# Patient Record
Sex: FEMALE
[De-identification: names, ages, dates, MRNs, and addresses within clinical notes are randomized per-mention and may not be internally consistent; named-entity substitution may affect disease eponyms.]

---

## 2021-08-28 ENCOUNTER — HOSPITAL ENCOUNTER (EMERGENCY)
Dept: HOSPITAL 94 - ER | Age: 71
Discharge: LEFT BEFORE DISCHARGE | End: 2021-08-28
Payer: MEDICARE

## 2021-08-28 VITALS — DIASTOLIC BLOOD PRESSURE: 88 MMHG | SYSTOLIC BLOOD PRESSURE: 152 MMHG

## 2021-08-28 VITALS — HEIGHT: 67 IN | WEIGHT: 280.58 LBS | BODY MASS INDEX: 44.04 KG/M2

## 2021-08-28 DIAGNOSIS — Z53.21: ICD-10-CM

## 2021-08-28 DIAGNOSIS — Z00.8: Primary | ICD-10-CM

## 2021-08-28 NOTE — NUR
PT AND HER  CHECKED IN FOR MH SERVICED.  PT WAS TOLD THAT THERE WOULD BE 
A BED WAITING FOR THEM.  PT WAS ADVISED THAT THE ER WAS FULL AND THAT THERE WAS 
GOING TO BE A WAIT OF AN UNKNOWN AMOUNT OF TIME.  IT WAS EXPLAINED TO THE PT 
THE PROCESS FOR MH EVALUATIONS AND THAT WHEN THERE WAS AN AVAILABLE ROOM FOR 
HER WE WOULD GET HER BACK ASAP, AND THAT WE WOULD START WITH GETTING LABS WHILE 
WAITING FOR AN OPEN ROOM.  PT STATES THAT SHE CAN NOT WAIT OR SIT IN HER 
SCOOTER FOR A LONG PERIOD OF TIME AND THAT SHE WANTED TO GO HOME.  PT'S  
WANTED TO GO HOME AND CALL TO SEE WHEN A ROOM WOULD BE AVAILABLE.  IT WAS 
EXPLAINED THAT THIS WAS NOT THE PROCESS, THAT WE DO NOT HOLD ROOMS AND THAT IF 
THEY DECIDED TO GO HOME AND WANTED TO COME BACK THAT THE PROCESS OF WAITING WAS 
POSSIBLE AND THAT LABS WOULD HAVE TO BE DRAWN AGAIN.

PT AND  DECIDED THAT THEY WOULD GO HOME AND GIVE THE PT'S MEDICATIONS A 
CHANCE TO WORK.  PT WAS ADVISE TO F/U WITH HER PSYCHIATRIST AND PMD MONDAY BUT 
THEY SAID THEIR DRS WERE NOT HELP.  PT WAS ENCOURAGED TO STAY AND THAT WE WOULD 
TRY TO GET HER IN ASAP, BUT PT WANTED TO GO HOME.



CHG RN WAS INFORMED OF PT'S HX AND DECISION. Detail Level: Detailed Detail Level: Simple

## 2021-10-01 ENCOUNTER — HOSPITAL ENCOUNTER (EMERGENCY)
Dept: HOSPITAL 94 - ER | Age: 71
LOS: 4 days | Discharge: HOME | End: 2021-10-05
Payer: MEDICARE

## 2021-10-01 VITALS — HEIGHT: 67 IN | WEIGHT: 279.99 LBS | BODY MASS INDEX: 43.94 KG/M2

## 2021-10-01 DIAGNOSIS — R53.1: ICD-10-CM

## 2021-10-01 DIAGNOSIS — F41.9: ICD-10-CM

## 2021-10-01 DIAGNOSIS — R45.851: Primary | ICD-10-CM

## 2021-10-01 DIAGNOSIS — Z20.822: ICD-10-CM

## 2021-10-01 DIAGNOSIS — F32.9: ICD-10-CM

## 2021-10-01 LAB
ALBUMIN SERPL BCP-MCNC: 3.6 G/DL (ref 3.4–5)
ALBUMIN/GLOB SERPL: 1 {RATIO} (ref 1.1–1.5)
ALP SERPL-CCNC: 124 IU/L (ref 46–116)
ALT SERPL W P-5'-P-CCNC: 45 U/L (ref 12–78)
AMPHETAMINES UR QL SCN: NEGATIVE
ANION GAP SERPL CALCULATED.3IONS-SCNC: 9 MMOL/L (ref 8–16)
AST SERPL W P-5'-P-CCNC: 26 U/L (ref 10–37)
BACTERIA URNS QL MICRO: (no result) /HPF
BARBITURATES UR QL SCN: NEGATIVE
BASOPHILS # BLD AUTO: 0 X10'3 (ref 0–0.2)
BASOPHILS NFR BLD AUTO: 0.4 % (ref 0–1)
BENZODIAZ UR QL SCN: POSITIVE
BILIRUB SERPL-MCNC: 0.5 MG/DL (ref 0.1–1)
BUN SERPL-MCNC: 15 MG/DL (ref 7–18)
BUN/CREAT SERPL: 18.3 (ref 6.6–38)
BZE UR QL SCN: NEGATIVE
CALCIUM SERPL-MCNC: 9.1 MG/DL (ref 8.5–10.1)
CANNABINOIDS UR QL SCN: NEGATIVE
CHLORIDE SERPL-SCNC: 102 MMOL/L (ref 99–107)
CLARITY UR: (no result)
CO2 SERPL-SCNC: 28.7 MMOL/L (ref 24–32)
COLOR UR: YELLOW
CREAT SERPL-MCNC: 0.82 MG/DL (ref 0.4–0.9)
DEPRECATED SQUAMOUS URNS QL MICRO: (no result) /LPF
EOSINOPHIL # BLD AUTO: 0.2 X10'3 (ref 0–0.9)
EOSINOPHIL NFR BLD AUTO: 2.6 % (ref 0–6)
ERYTHROCYTE [DISTWIDTH] IN BLOOD BY AUTOMATED COUNT: 16.3 % (ref 11.5–14.5)
ETHANOL SERPL-MCNC: < 0.01 GM/DL (ref 0–0.01)
GFR SERPL CREATININE-BSD FRML MDRD: 69 ML/MIN
GLUCOSE SERPL-MCNC: 153 MG/DL (ref 70–104)
GLUCOSE UR STRIP-MCNC: NEGATIVE MG/DL
HCT VFR BLD AUTO: 45.1 % (ref 35–45)
HGB BLD-MCNC: 15 G/DL (ref 12–16)
HGB UR QL STRIP: NEGATIVE
KETONES UR STRIP-MCNC: NEGATIVE MG/DL
LEUKOCYTE ESTERASE UR QL STRIP: NEGATIVE
LYMPHOCYTES # BLD AUTO: 1.3 X10'3 (ref 1.1–4.8)
LYMPHOCYTES NFR BLD AUTO: 16.3 % (ref 21–51)
MCH RBC QN AUTO: 30.2 PG (ref 27–31)
MCHC RBC AUTO-ENTMCNC: 33.2 G/DL (ref 33–36.5)
MCV RBC AUTO: 91 FL (ref 78–98)
METHADONE UR QL SCN: NEGATIVE
MONOCYTES # BLD AUTO: 0.5 X10'3 (ref 0–0.9)
MONOCYTES NFR BLD AUTO: 6.3 % (ref 2–12)
MUCOUS THREADS URNS QL MICRO: (no result) /LPF
NEUTROPHILS # BLD AUTO: 6.1 X10'3 (ref 1.8–7.7)
NEUTROPHILS NFR BLD AUTO: 74.4 % (ref 42–75)
NITRITE UR QL STRIP: POSITIVE
OPIATES UR QL SCN: NEGATIVE
PCP UR QL SCN: NEGATIVE
PH UR STRIP: 5 [PH] (ref 4.8–8)
PLATELET # BLD AUTO: 235 X10'3 (ref 140–440)
PMV BLD AUTO: 8 FL (ref 7.4–10.4)
POTASSIUM SERPL-SCNC: 4.1 MMOL/L (ref 3.5–5.1)
PROT SERPL-MCNC: 7.1 G/DL (ref 6.4–8.2)
PROT UR QL STRIP: NEGATIVE MG/DL
RBC # BLD AUTO: 4.95 X10'6 (ref 4.2–5.6)
RBC #/AREA URNS HPF: (no result) /HPF (ref 0–2)
SODIUM SERPL-SCNC: 140 MMOL/L (ref 135–145)
SP GR UR STRIP: 1.02 (ref 1–1.03)
URN COLLECT METHOD CLASS: (no result)
UROBILINOGEN UR STRIP-MCNC: 0.2 E.U/DL (ref 0.2–1)
WBC # BLD AUTO: 8.2 X10'3 (ref 4.5–11)
WBC #/AREA URNS HPF: (no result) /HPF (ref 0–4)

## 2021-10-01 PROCEDURE — 80320 DRUG SCREEN QUANTALCOHOLS: CPT

## 2021-10-01 PROCEDURE — 80305 DRUG TEST PRSMV DIR OPT OBS: CPT

## 2021-10-01 PROCEDURE — 36415 COLL VENOUS BLD VENIPUNCTURE: CPT

## 2021-10-01 PROCEDURE — 82948 REAGENT STRIP/BLOOD GLUCOSE: CPT

## 2021-10-01 PROCEDURE — 80053 COMPREHEN METABOLIC PANEL: CPT

## 2021-10-01 PROCEDURE — 81001 URINALYSIS AUTO W/SCOPE: CPT

## 2021-10-01 PROCEDURE — 99285 EMERGENCY DEPT VISIT HI MDM: CPT

## 2021-10-01 PROCEDURE — 87635 SARS-COV-2 COVID-19 AMP PRB: CPT

## 2021-10-01 PROCEDURE — 85025 COMPLETE CBC W/AUTO DIFF WBC: CPT

## 2021-10-01 PROCEDURE — 84443 ASSAY THYROID STIM HORMONE: CPT

## 2021-10-01 RX ADMIN — PROPRANOLOL HYDROCHLORIDE SCH MG: 10 TABLET ORAL at 20:55

## 2021-10-01 NOTE — NUR
The patient moved to bed 23 in the ER overflow. She was tearful, very anxious 
and overwhelmed. She stated that she has been thinking about suicide all the 
time and added, "I really wish I could die" She is incontinent of urine and a 
straight cath was done to obtain a urine samble. Her urine was very dark. She 
is currently a patient at Dr. Cason office in [place] and was last seen a 
couple of weeks ago. She did have a suicide attempt approximately one and half 
months ago by taking an overdose of rx medications. She is nonambulatory and 
weak. She has difficulty during side to side. She has open wounds on her 
buttocks. She has yeast rash to bilateral breasts. A PT consult was ordered. SW 
was paged to assess needs at home. Psychiatry was notified that the patient 
would need a medication consult.

## 2021-10-02 RX ADMIN — LEVOTHYROXINE SODIUM SCH MCG: 75 TABLET ORAL at 08:36

## 2021-10-02 RX ADMIN — Medication SCH CAN: at 13:25

## 2021-10-02 RX ADMIN — Medication SCH CAN: at 18:00

## 2021-10-02 RX ADMIN — PROPRANOLOL HYDROCHLORIDE SCH MG: 10 TABLET ORAL at 20:21

## 2021-10-02 RX ADMIN — PROPRANOLOL HYDROCHLORIDE SCH MG: 10 TABLET ORAL at 08:36

## 2021-10-02 RX ADMIN — PROPRANOLOL HYDROCHLORIDE SCH MG: 10 TABLET ORAL at 13:22

## 2021-10-02 NOTE — NUR
Pt. incontinent of urine. Pt.'s linens changed and chucks put down. Pt. turned 
to her left side. Pt. c/o of anxiety and asked for something to distract her. 
Pt. given TV to watch.

## 2021-10-02 NOTE — NUR
Pt. awake and asking to be turned to relieve her pressure wounds. Assisted 
patient to turn onto her left side.  States she is "so uncomfortable". Admits 
to SI, had a plan to OD on pills, states she attempted suicide via this method 
"around a month and a half ago", went to [facility]. States she is very 
depressed and cannot get out of bed or ambulate. Pt feels she will be unable to 
go back to sleep.

## 2021-10-02 NOTE — NUR
1:1 done at bedside. PtNiels izquierdoies current SI/HI, A/V hallucinations. Pt. A&Ox4 
and cooperative with assessment. 

-------------------------------------------------------------------------------

Addendum: 10/02/21 at 1525 by SCOTT

-------------------------------------------------------------------------------

Pt. turned

## 2021-10-02 NOTE — NUR
Pt awake and requesting to be turned. Assisted patient to turn onto her left 
side. Pt states "that's much better."

## 2021-10-02 NOTE — NUR
Consulted with ER MD, he ordered 0.5 mg Ativan PO for pt's sleeplessness. If 
this is not effective, will consider additional dose of seroquel. Pt was 
compliant with medication.

## 2021-10-02 NOTE — NUR
Pt. awake and in bed in supine position. Pt. refused breakfast. Pt. took all 
medications. Pt. incontinent of urine, Pt. cleaned and changed. Pt. turned onto 
right side. Nystatin powder applied under breasts and pannis.

## 2021-10-02 NOTE — NUR
The patient refused her dinner but did accept her ensure drink. She stated that 
tonight her mood is good and that she currently is not suicidal and stated that 
she needs to go to a facility that is not psychiatric but based on her medical 
needs. She is alert and oriented. She is pleasant when approached

## 2021-10-03 RX ADMIN — LEVOTHYROXINE SODIUM SCH MCG: 75 TABLET ORAL at 08:25

## 2021-10-03 RX ADMIN — PROPRANOLOL HYDROCHLORIDE SCH MG: 10 TABLET ORAL at 20:07

## 2021-10-03 RX ADMIN — PROPRANOLOL HYDROCHLORIDE SCH MG: 10 TABLET ORAL at 14:02

## 2021-10-03 RX ADMIN — Medication SCH CAN: at 13:00

## 2021-10-03 RX ADMIN — Medication SCH CAN: at 08:24

## 2021-10-03 RX ADMIN — Medication SCH CAN: at 18:00

## 2021-10-03 RX ADMIN — PROPRANOLOL HYDROCHLORIDE SCH MG: 10 TABLET ORAL at 08:15

## 2021-10-03 NOTE — NUR
Patient had a good visit with her . she remains bed ridden, patient 
desires to stay in bed. Patient remains S/I, she denies hallucinations. Patient 
is medication compliant.

## 2021-10-03 NOTE — NUR
Patient sleeps quietly. Patient was coached earlier about positive lifestyle 
changes. Patient was cooperative. Initially this patient was observed crying 
and depresses. She did respond positively.

## 2021-10-03 NOTE — NUR
The patient was cleaned today, Nystatin powder was placed beneath breasts. 
Patient's small sacrael wound was cleaned and dressed. Patient was roled side 
to side with pillows placed, patient tollerated well.

## 2021-10-03 NOTE — NUR
Aftab had a large BM in bed. Patient was cleaned and rotated in bed. The 
small sacreal wound is cleaned now, a moisture barrier cream and bandage is 
applied. Patient tollerated well.

## 2021-10-03 NOTE — NUR
Patient's  visited and presented as loving and supportive. She was 
incontinent and was assisted with linen change. She was given tips on how to 
use her side rails and legs to move side to side. She was encourged to do leg 
lift exercises to gain strength. She reports her appetite is still poor. She 
reports that she believes that her energy level is "a little better" She denies 
suicidal thoughts but states she fells sad. Discussed her internal dialogue of 
negative thinking and how that works against her goals

## 2021-10-04 RX ADMIN — PROPRANOLOL HYDROCHLORIDE SCH MG: 10 TABLET ORAL at 12:12

## 2021-10-04 RX ADMIN — PROPRANOLOL HYDROCHLORIDE SCH MG: 10 TABLET ORAL at 20:15

## 2021-10-04 RX ADMIN — Medication SCH CAN: at 18:48

## 2021-10-04 RX ADMIN — PROPRANOLOL HYDROCHLORIDE SCH MG: 10 TABLET ORAL at 07:27

## 2021-10-04 RX ADMIN — Medication SCH CAN: at 14:15

## 2021-10-04 RX ADMIN — Medication SCH CAN: at 08:00

## 2021-10-04 RX ADMIN — LEVOTHYROXINE SODIUM SCH MCG: 75 TABLET ORAL at 07:25

## 2021-10-04 NOTE — NUR
Pt  at bedside at start of shift.  He and pt want the pt to go home 
tomorrow.  Pt calm and cooperative took all meds.  Red areas under breasts 
cleaned and nystatin powder applied.  Strips of gauze left under breasts to 
promote dryness.  Pt repositioned self.  Pillows applied to help he maintain 
side lying position.

## 2021-10-05 VITALS — SYSTOLIC BLOOD PRESSURE: 141 MMHG | DIASTOLIC BLOOD PRESSURE: 71 MMHG

## 2021-10-05 RX ADMIN — LEVOTHYROXINE SODIUM SCH MCG: 75 TABLET ORAL at 08:27

## 2021-10-05 RX ADMIN — Medication SCH CAN: at 08:27

## 2021-10-05 RX ADMIN — PROPRANOLOL HYDROCHLORIDE SCH MG: 10 TABLET ORAL at 08:26

## 2021-10-05 NOTE — NUR
Pt on phone with . Pt's hold is up and pt will discharge home with 
. Pt is A&O x4. Pt is goal-oriented and denies all mental health 
symptoms. Pt declined her breakfast states she "usually isn't hungry in the 
morning."

## 2021-10-05 NOTE — NUR
Pt repositioned.  Henri care done, linen change.   300cc dark dashawn urine in 
henri wick canister, marked  with time in black sharpie.  Superficial open area 
on rt buttock cleaned and barrier cream applied.  Left buttock foam drsg 
applied by dayshift CD+I.  Pt said she did get some good sleep.  C/O 
uncomfortable bed.

## 2021-10-05 NOTE — NUR
Pt requested a bed pan, thought she had to have a BM. LBM was yesterday. Pt was 
turned to right side at this time.

## 2021-10-05 NOTE — NUR
Pt awake, no complaints. 134 morning blood sugar obtained. Pt states "my hold 
is up today and I want to go home." Writer explained [facility] will be in to 
reevaluate as her 5150 hold is up today. Pt denies suicidal thoughts and 
presents with a linear thought process.

## 2021-10-05 NOTE — NUR
DISCHARGE NOTE: 



Pt is being discharged home with her . Pt left at 0950. Pt left 
independently on her scooter. Pt left with personal belongings. Pt denies 
suicidal thoughts at this time. Pt denies A/VH. Pt has a follow up appointment 
with her MH provider Racheal JESSICA at Dr. Casno office on 10/11/21. Pt 
verbalizes and agrees the importance of keeping follow up. Pt A&Ox4

## 2023-04-01 ENCOUNTER — HOSPITAL ENCOUNTER (OUTPATIENT)
Dept: HOSPITAL 94 - LAB SPEC | Age: 73
Discharge: HOME | End: 2023-04-01
Attending: FAMILY MEDICINE
Payer: MEDICARE

## 2023-04-01 DIAGNOSIS — Z87.440: Primary | ICD-10-CM

## 2023-04-01 DIAGNOSIS — N39.0: ICD-10-CM

## 2023-04-01 LAB
BACTERIA URNS QL MICRO: (no result) /HPF
CAOX CRY URNS QL MICRO: (no result) /HPF
CLARITY UR: CLEAR
COLOR UR: YELLOW
DEPRECATED SQUAMOUS URNS QL MICRO: (no result) /LPF
GLUCOSE UR STRIP-MCNC: NEGATIVE MG/DL
HGB UR QL STRIP: NEGATIVE
KETONES UR STRIP-MCNC: NEGATIVE MG/DL
LEUKOCYTE ESTERASE UR QL STRIP: (no result)
NITRITE UR QL STRIP: NEGATIVE
PH UR STRIP: 6.5 [PH] (ref 4.8–8)
PROT UR QL STRIP: NEGATIVE MG/DL
RBC #/AREA URNS HPF: (no result) /HPF (ref 0–2)
SP GR UR STRIP: 1.01 (ref 1–1.03)
URN COLLECT METHOD CLASS: (no result)
UROBILINOGEN UR STRIP-MCNC: 0.2 E.U/DL (ref 0.2–1)
WBC #/AREA URNS HPF: (no result) /HPF (ref 0–4)
YEAST URNS QL MICRO: (no result) /HPF

## 2023-04-01 PROCEDURE — 81001 URINALYSIS AUTO W/SCOPE: CPT

## 2023-04-01 PROCEDURE — 87088 URINE BACTERIA CULTURE: CPT
